# Patient Record
Sex: MALE | Race: WHITE | NOT HISPANIC OR LATINO | URBAN - METROPOLITAN AREA
[De-identification: names, ages, dates, MRNs, and addresses within clinical notes are randomized per-mention and may not be internally consistent; named-entity substitution may affect disease eponyms.]

---

## 2018-08-09 ENCOUNTER — TRANSFERRED RECORDS (OUTPATIENT)
Dept: HEALTH INFORMATION MANAGEMENT | Facility: CLINIC | Age: 58
End: 2018-08-09

## 2018-08-21 ENCOUNTER — TRANSFERRED RECORDS (OUTPATIENT)
Dept: HEALTH INFORMATION MANAGEMENT | Facility: CLINIC | Age: 58
End: 2018-08-21

## 2019-01-23 ENCOUNTER — TRANSFERRED RECORDS (OUTPATIENT)
Dept: HEALTH INFORMATION MANAGEMENT | Facility: CLINIC | Age: 59
End: 2019-01-23

## 2019-02-19 ENCOUNTER — TRANSFERRED RECORDS (OUTPATIENT)
Dept: HEALTH INFORMATION MANAGEMENT | Facility: CLINIC | Age: 59
End: 2019-02-19

## 2019-02-21 ENCOUNTER — TRANSFERRED RECORDS (OUTPATIENT)
Dept: HEALTH INFORMATION MANAGEMENT | Facility: CLINIC | Age: 59
End: 2019-02-21

## 2019-02-26 ENCOUNTER — TRANSFERRED RECORDS (OUTPATIENT)
Dept: HEALTH INFORMATION MANAGEMENT | Facility: CLINIC | Age: 59
End: 2019-02-26

## 2019-02-26 ENCOUNTER — PRE VISIT (OUTPATIENT)
Dept: UROLOGY | Facility: CLINIC | Age: 59
End: 2019-02-26

## 2019-03-02 ENCOUNTER — DOCUMENTATION ONLY (OUTPATIENT)
Dept: CARE COORDINATION | Facility: CLINIC | Age: 59
End: 2019-03-02

## 2019-03-27 ENCOUNTER — PRE VISIT (OUTPATIENT)
Dept: UROLOGY | Facility: CLINIC | Age: 59
End: 2019-03-27

## 2019-03-27 NOTE — TELEPHONE ENCOUNTER
Chief Complaint : New bladder tumors      New Hx/Sx:    Records/Orders/Proced: [facility]     Pt Contacted: no    At Rooming:  Normal

## 2019-06-03 ENCOUNTER — ALLIED HEALTH/NURSE VISIT (OUTPATIENT)
Dept: UROLOGY | Facility: CLINIC | Age: 59
End: 2019-06-03
Payer: COMMERCIAL

## 2019-06-03 ENCOUNTER — PRE VISIT (OUTPATIENT)
Dept: SURGERY | Facility: CLINIC | Age: 59
End: 2019-06-03

## 2019-06-03 ENCOUNTER — ANCILLARY PROCEDURE (OUTPATIENT)
Dept: CT IMAGING | Facility: CLINIC | Age: 59
End: 2019-06-03
Attending: UROLOGY
Payer: COMMERCIAL

## 2019-06-03 ENCOUNTER — OFFICE VISIT (OUTPATIENT)
Dept: UROLOGY | Facility: CLINIC | Age: 59
End: 2019-06-03
Payer: COMMERCIAL

## 2019-06-03 VITALS
HEIGHT: 72 IN | SYSTOLIC BLOOD PRESSURE: 146 MMHG | DIASTOLIC BLOOD PRESSURE: 84 MMHG | HEART RATE: 75 BPM | WEIGHT: 219.2 LBS | BODY MASS INDEX: 29.69 KG/M2

## 2019-06-03 DIAGNOSIS — N32.89 BLADDER MASS: Primary | ICD-10-CM

## 2019-06-03 DIAGNOSIS — N32.89 BLADDER MASS: ICD-10-CM

## 2019-06-03 LAB
CREAT BLD-MCNC: 3.8 MG/DL (ref 0.66–1.25)
GFR SERPL CREATININE-BSD FRML MDRD: 17 ML/MIN/{1.73_M2}

## 2019-06-03 RX ORDER — ALLOPURINOL 100 MG/1
200 TABLET ORAL
COMMUNITY
Start: 2019-02-28

## 2019-06-03 RX ORDER — LANOLIN ALCOHOL/MO/W.PET/CERES
1000 CREAM (GRAM) TOPICAL
COMMUNITY
Start: 2019-02-28

## 2019-06-03 RX ORDER — HYDRALAZINE HYDROCHLORIDE 100 MG/1
100 TABLET, FILM COATED ORAL
COMMUNITY
Start: 2019-04-03

## 2019-06-03 RX ORDER — CARVEDILOL 25 MG/1
25 TABLET ORAL
COMMUNITY
Start: 2019-04-03

## 2019-06-03 RX ORDER — CEFAZOLIN SODIUM 1 G/50ML
1 INJECTION, SOLUTION INTRAVENOUS SEE ADMIN INSTRUCTIONS
Status: CANCELLED | OUTPATIENT
Start: 2019-06-03

## 2019-06-03 RX ORDER — CEFAZOLIN SODIUM 2 G/50ML
2 SOLUTION INTRAVENOUS
Status: CANCELLED | OUTPATIENT
Start: 2019-06-03

## 2019-06-03 RX ORDER — ISOSORBIDE MONONITRATE 30 MG/1
TABLET, EXTENDED RELEASE ORAL
Refills: 3 | COMMUNITY
Start: 2019-05-10

## 2019-06-03 RX ORDER — ACETAMINOPHEN 500 MG
1000 TABLET ORAL
COMMUNITY
Start: 2018-08-09 | End: 2019-06-07

## 2019-06-03 RX ORDER — ASPIRIN 81 MG/1
TABLET, COATED ORAL
Refills: 3 | COMMUNITY
Start: 2019-04-03

## 2019-06-03 ASSESSMENT — ENCOUNTER SYMPTOMS
DYSURIA: 0
DIFFICULTY URINATING: 0
EYE PAIN: 0
HEMATURIA: 1
EYE WATERING: 1
FLANK PAIN: 0
DOUBLE VISION: 0

## 2019-06-03 ASSESSMENT — MIFFLIN-ST. JEOR: SCORE: 1857.28

## 2019-06-03 ASSESSMENT — PAIN SCALES - GENERAL: PAINLEVEL: NO PAIN (0)

## 2019-06-03 NOTE — NURSING NOTE
Chief Complaint   Patient presents with     Consult For     Bladder tumors       Blood pressure 146/84, pulse 75, height 1.829 m (6'), weight 99.4 kg (219 lb 3.2 oz). Body mass index is 29.73 kg/m .    There is no problem list on file for this patient.      No Known Allergies    Current Outpatient Medications   Medication Sig Dispense Refill     acetaminophen (TYLENOL) 500 MG tablet Take 1,000 mg by mouth       allopurinol (ZYLOPRIM) 100 MG tablet Take 200 mg by mouth       ASPIRIN LOW DOSE 81 MG EC tablet   3     carvedilol (COREG) 25 MG tablet Take 25 mg by mouth       cyanocobalamin (VITAMIN B-12) 1000 MCG tablet Take 1,000 mcg by mouth       hydrALAZINE (APRESOLINE) 100 MG tablet Take 100 mg by mouth       isosorbide mononitrate (IMDUR) 30 MG 24 hr tablet   3       Social History     Tobacco Use     Smoking status: Never Smoker     Smokeless tobacco: Never Used   Substance Use Topics     Alcohol use: None     Drug use: None       Dang Rincon LPN  6/3/2019  9:02 AM
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
## 2019-06-03 NOTE — TELEPHONE ENCOUNTER
FUTURE VISIT INFORMATION      SURGERY INFORMATION:    Date: 19    Location: [facility] OR    Surgeon:  Nasim Ventura    Anesthesia Type:  Other    RECORDS REQUESTED FROM:       Primary Care Provider: Luis Alfredo Segura    Pertinent Medical History: Bladder mass, COngestive heart failure, Chronic Kidney disease, Hypertension    Most recent EKG+ Tracin18- Segura- requested tracing    Most recent ECHO: 18
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
## 2019-06-03 NOTE — PATIENT INSTRUCTIONS
Schedule CT for today.  Schedule surgery.        It was a pleasure meeting with you today.  Thank you for allowing me and my team the privilege of caring for you today.  YOU are the reason we are here, and I truly hope we provided you with the excellent service you deserve.  Please let us know if there is anything else we can do for you so that we can be sure you are leaving completely satisfied with your care experience.    It was a pleasure meeting with you today.  Thank you for allowing me and my team the privilege of caring for you today.  YOU are the reason we are here, and I truly hope we provided you with the excellent service you deserve.  Please let us know if there is anything else we can do for you so that we can be sure you are leaving completely satisfied with your care experience.        Dang Rincon LPN

## 2019-06-03 NOTE — NURSING NOTE
Pre Op Teaching Flowsheet       Pre and Post op Patient Education  Relevant Diagnosis:  Bladder tumor  Surgical procedure:  Cystoscopy possible biopsy and fulguration or transurethral resection of bladder tumor, possible cysview instillation  Teaching Topic:  Pre and post op teaching  Person Involved in teaching: Cesar Garrison    Motivation Level:  Asks Questions: Yes  Eager to Learn:  Yes  Cooperative: Yes  Receptive (willing/able to accept information):  Yes    Patient demonstrates understanding of the following:  Date of surgery:  6/17/19  Location of surgery:  [facility]- 5th Floor  History and Physical and any other testing necessary prior to surgery: Yes  Required time line for completion of History and Physical and any pre-op testing: Yes    Patient demonstrates understanding of the following:  Pre-op bowel prep:  None  Pre-op showering/scrub information with PCMX Soap: Yes  Blood thinner medications discussed and when to stop (if applicable):  Yes      Infection Prevention:   Patient demonstrates understanding of the following:  Surgical procedure site care taught: at time of discharge  Signs and symptoms of infection taught:  Yes      Post-op follow-up:  Discussed how to contact the hospital, nurse, and clinic scheduling staff if necessary.    Instructional materials used/given/mailed:  [facility] Booklet, post op teaching sheet, Map, Soap, and arrival/location information.    Surgical instructions packet given to patient in office:  Yes.    Patient has a  and someone to stay with him for the first 24 hours.

## 2019-06-07 ENCOUNTER — ANESTHESIA EVENT (OUTPATIENT)
Dept: SURGERY | Facility: AMBULATORY SURGERY CENTER | Age: 59
End: 2019-06-07

## 2019-06-07 ENCOUNTER — OFFICE VISIT (OUTPATIENT)
Dept: SURGERY | Facility: CLINIC | Age: 59
End: 2019-06-07
Payer: COMMERCIAL

## 2019-06-07 VITALS
OXYGEN SATURATION: 97 % | TEMPERATURE: 97.8 F | DIASTOLIC BLOOD PRESSURE: 70 MMHG | WEIGHT: 215 LBS | SYSTOLIC BLOOD PRESSURE: 119 MMHG | HEIGHT: 72 IN | BODY MASS INDEX: 29.12 KG/M2 | HEART RATE: 76 BPM

## 2019-06-07 DIAGNOSIS — Z01.818 PREOP EXAMINATION: ICD-10-CM

## 2019-06-07 DIAGNOSIS — D49.4 BLADDER TUMOR: ICD-10-CM

## 2019-06-07 DIAGNOSIS — Z01.818 PREOP EXAMINATION: Primary | ICD-10-CM

## 2019-06-07 LAB
ALBUMIN UR-MCNC: 100 MG/DL
APPEARANCE UR: ABNORMAL
BILIRUB UR QL STRIP: NEGATIVE
COLOR UR AUTO: ABNORMAL
ERYTHROCYTE [DISTWIDTH] IN BLOOD BY AUTOMATED COUNT: 14.7 % (ref 10–15)
GLUCOSE UR STRIP-MCNC: NEGATIVE MG/DL
HCT VFR BLD AUTO: 35.5 % (ref 40–53)
HGB BLD-MCNC: 10.9 G/DL (ref 13.3–17.7)
HGB UR QL STRIP: ABNORMAL
KETONES UR STRIP-MCNC: NEGATIVE MG/DL
LEUKOCYTE ESTERASE UR QL STRIP: ABNORMAL
MCH RBC QN AUTO: 26.3 PG (ref 26.5–33)
MCHC RBC AUTO-ENTMCNC: 30.7 G/DL (ref 31.5–36.5)
MCV RBC AUTO: 86 FL (ref 78–100)
NITRATE UR QL: NEGATIVE
PH UR STRIP: 6 PH (ref 5–7)
PLATELET # BLD AUTO: 215 10E9/L (ref 150–450)
RBC # BLD AUTO: 4.15 10E12/L (ref 4.4–5.9)
RBC #/AREA URNS AUTO: >182 /HPF (ref 0–2)
SOURCE: ABNORMAL
SP GR UR STRIP: 1.01 (ref 1–1.03)
TRANS CELLS #/AREA URNS HPF: 2 /HPF
UROBILINOGEN UR STRIP-MCNC: 0 MG/DL (ref 0–2)
WBC # BLD AUTO: 8.5 10E9/L (ref 4–11)
WBC #/AREA URNS AUTO: 26 /HPF (ref 0–5)

## 2019-06-07 RX ORDER — TORSEMIDE 100 MG/1
50 TABLET ORAL 2 TIMES DAILY
COMMUNITY
Start: 2019-05-30

## 2019-06-07 SDOH — HEALTH STABILITY: MENTAL HEALTH: HOW OFTEN DO YOU HAVE A DRINK CONTAINING ALCOHOL?: NEVER

## 2019-06-07 ASSESSMENT — LIFESTYLE VARIABLES: TOBACCO_USE: 0

## 2019-06-07 ASSESSMENT — MIFFLIN-ST. JEOR: SCORE: 1838.23

## 2019-06-07 NOTE — PATIENT INSTRUCTIONS
Preparing for Your Surgery      Name:  Cesar Garrison   MRN:  [id number]   :  1960   Today's Date:  2019     Arriving for surgery:  Surgery date:  2019  Arrival time:  10:15 am  Please come to:     [facility]  [address] [place] [id number]     Parking is available in front of the Clinics and Surgery Center building from 5:30AM to 8:00PM.  -  Proceed to the 5th floor to check into the Ambulatory Surgery Center.              >> There will be patient concierges on the 1st and 5th floor, for assistance or an escort, if you would like.              >> Please call [phone] with any questions.    What can I eat or drink?  -  You may have solid food or milk products until 8 hours prior to your surgery. (3 am)  -  You may have water, apple juice or 7up/Sprite until 2 hours prior to your surgery.(9 am)    Which medicines can I take?        Stop Aspirin, vitamins and supplements one week prior to surgery.      Hold Ibuprofen for 24 hours and/or Naproxen for 48 hours prior to surgery.     -  Please take these medications the day of surgery:     All usual am prescription medications         How do I prepare myself?  -  Take two showers: one the night before surgery; and one the morning of surgery.         Use Scrubcare or Hibiclens to wash from neck down.  You may use your own shampoo and conditioner. No other hair products.   -  Do NOT use lotion, powder, deodorant, or antiperspirant the day of your surgery.  -  Do NOT wear any makeup, fingernail polish or jewelry.  - Do not bring your own medications to the hospital, except for inhalers and eye drops.  -  Bring your ID and insurance card.    Questions or Concerns:    -If you are scheduled at the Ambulatory Surgery Center please call [phone].    -For questions after surgery please call your surgeons office.

## 2019-06-07 NOTE — H&P
Pre-Operative H & P     CC:  Preoperative exam to assess for increased cardiopulmonary risk while undergoing surgery and anesthesia.    Date of Encounter: 6/7/2019  Primary Care Physician:  System, Provider Not In  Associated diagnosis:  Bladder tumor    HPI  Cesar Garrison is a 58 year old male who presents for pre-operative H & P in preparation for a Cystoscopy Possible Biopsy And Fulguration Or Transurethral Resection Of Bladder Tumor, Possible Cysview Instillation on 6/17/2019 by Dr. Ventura at [facility].     Cesar Garrison is a 58 year old male with cardiomyopathy, CHF, hypertension, anemia, gout, CKD 4 and diabetes that has a bladder tumor.  He started having hematuria in January 2019.  He treated it with cranberry juice first and it improved the symptoms somewhat, but he continued to still have intermittent hematuria.  He sought evaluation with urology in [place] and further evaluation resulted in the finding of a bladder tumor concerning for malignancy.  He was subsequently referred here to Dr. Ventura for surgical consultation.  The above listed procedure has been recommended.     History is obtained from the patient and the medical record.     Past Medical History  Past Medical History:   Diagnosis Date     Bladder tumor      Cardiomyopathy (H)      CKD (chronic kidney disease)      Congestive heart failure (CHF) (H)      Diabetes mellitus (H)      Gout      Hypertension        Past Surgical History  History reviewed. No pertinent surgical history.    Hx of Blood transfusions/reactions: none    Hx of abnormal bleeding or anti-platelet use: aspirin      Steroid use in the last year: Unknown type of oral steroid in Aug 2018 for a joint issue.  No long term steroids.    Personal or FH with difficulty with Anesthesia:  No history of surgery    Prior to Admission Medications  Current Outpatient Medications   Medication Sig Dispense Refill     allopurinol (ZYLOPRIM) 100 MG tablet  Take 200 mg by mouth       ASPIRIN LOW DOSE 81 MG EC tablet   3     carvedilol (COREG) 25 MG tablet Take 25 mg by mouth       cyanocobalamin (VITAMIN B-12) 1000 MCG tablet Take 1,000 mcg by mouth       hydrALAZINE (APRESOLINE) 100 MG tablet Take 100 mg by mouth       isosorbide mononitrate (IMDUR) 30 MG 24 hr tablet   3     torsemide (DEMADEX) 100 MG tablet Take 50 mg by mouth 2 times daily         Allergies  No Known Allergies    Social History  Social History     Socioeconomic History     Marital status: Single     Spouse name: Not on file     Number of children: 0     Years of education: Not on file     Highest education level: Not on file   Occupational History     Occupation: disability   Social Needs     Financial resource strain: Not on file     Food insecurity:     Worry: Not on file     Inability: Not on file     Transportation needs:     Medical: Not on file     Non-medical: Not on file   Tobacco Use     Smoking status: Never Smoker     Smokeless tobacco: Never Used   Substance and Sexual Activity     Alcohol use: Never     Frequency: Never     Drug use: Never     Sexual activity: Not on file   Lifestyle     Physical activity:     Days per week: Not on file     Minutes per session: Not on file     Stress: Not on file   Relationships     Social connections:     Talks on phone: Not on file     Gets together: Not on file     Attends Bahai service: Not on file     Active member of club or organization: Not on file     Attends meetings of clubs or organizations: Not on file     Relationship status: Not on file     Intimate partner violence:     Fear of current or ex partner: Not on file     Emotionally abused: Not on file     Physically abused: Not on file     Forced sexual activity: Not on file   Other Topics Concern     Not on file   Social History Narrative     Not on file       Family History  Family History   Problem Relation Age of Onset     Dementia Mother      Cerebrovascular Disease Mother       "Osteoarthritis Mother      Cancer Father         unsure what type     Alcoholism Father      No Known Problems Sister      Hypertension Brother      Osteoarthritis Brother      Gout Brother      Other - See Comments Brother         prediabetes     No Known Problems Half-Brother      No Known Problems Half-Sister                  ROS/MED HX  The complete review of systems is negative other than noted in the HPI or here.   ENT/Pulmonary:     (+)EBONY risk factors hypertension, , . .   (-) tobacco use   Neurologic:  - neg neurologic ROS     Cardiovascular: Comment: cardiomyopathy    (+) hypertension----. : . CHF Last EF: 54 date: 8/2018 . . :. .      (-) FLORES   METS/Exercise Tolerance:  >4 METS   Hematologic:     (+) Anemia, -      Musculoskeletal:   (+)  other musculoskeletal- gout - no flare ups in the last year      GI/Hepatic:  - neg GI/hepatic ROS       Renal/Genitourinary:     (+) chronic renal disease, type: CRI, Pt does not require dialysis, Pt has no history of transplant, Other Renal/ Genitourinary, bladder tumor      Endo:     (+) type II DM Last HgA1c: 4.4 date: 5/28/2019 Not using insulin - not using insulin pump Normal glucose range: unknown not previously admitted for DM/DKA Diabetic complications: cardiac problems, .      Psychiatric:  - neg psychiatric ROS       Infectious Disease:  - neg infectious disease ROS       Malignancy:      - no malignancy   Other:    (+) no H/O Chronic Pain,                       PHYSICAL EXAM:   Mental Status/Neuro: A/A/O   Airway: Facies: Feasible  Mallampati: I  Mouth/Opening: Full  TM distance: > 6 cm  Neck ROM: Full   Respiratory: Auscultation: CTAB     Resp. Rate: Normal     Resp. Effort: Normal      CV: Rhythm: Regular  Heart: Normal Sounds  Pulses: Weak  Edema: RLE; LLE   Comments: Trace BLE edema     Dental:  Dental Comments: Multiple missing and broken teeth                Temp: 97.8  F (36.6  C)   BP: 119/70 Pulse: 76     SpO2: 97 %         215 lbs 0 oz  6' 0\"   Body " mass index is 29.16 kg/m .       Physical Exam  Constitutional: Awake, alert, cooperative, no apparent distress, and appears stated age.  Eyes: Pupils equal, round and reactive to light, extra ocular muscles intact, sclera clear, conjunctiva normal.  HENT: Normocephalic, oral pharynx with moist mucus membranes.  Poor dentition with multiple missing and broken teeth. No goiter appreciated.   Respiratory: Clear to auscultation bilaterally, no crackles or wheezing.  Cardiovascular: Regular rate and rhythm, normal S1 and S2, and no murmur noted.  Carotids +2, no bruits. Trace BLE edema. Palpable pulses to radial.  Weak  DP and PT arteries.   GI: Normal bowel sounds, soft, non-distended, non-tender, no masses palpated, no hepatosplenomegaly.    Lymph/Hematologic: No cervical lymphadenopathy and no supraclavicular lymphadenopathy.  Genitourinary:  deferred  Skin: Warm and dry.  No rashes at anticipated surgical site.   Musculoskeletal: Full ROM of neck. There is no redness, warmth, or swelling of the joints. Gross motor strength is normal.    Neurologic: Awake, alert, oriented to name, place and time. Cranial nerves II-XII are grossly intact. Gait is normal.   Neuropsychiatric: Calm, cooperative. Normal affect.     Labs: (personally reviewed)   6/3/19 10:13 AM [id number]    Component Value Flag Ref Range Units Status Collected Lab   Creatinine 3.8  High   0.66 - 1.25 mg/dL Final 06/03/2019 10:13    GFR Estimate 17  Low   >60 mL/min/ Final 06/03/2019 10:13    GFR Estimate If Black            Component      Latest Ref Rng & Units 6/7/2019   WBC      4.0 - 11.0 10e9/L 8.5   RBC Count      4.4 - 5.9 10e12/L 4.15 (L)   Hemoglobin      13.3 - 17.7 g/dL 10.9 (L)   Hematocrit      40.0 - 53.0 % 35.5 (L)   MCV      78 - 100 fl 86   MCH      26.5 - 33.0 pg 26.3 (L)   MCHC      31.5 - 36.5 g/dL 30.7 (L)   RDW      10.0 - 15.0 % 14.7   Platelet Count      150 - 450 10e9/L 215       Echocardiogram  12/2018  Ejection Fraction  "54     LV Mass Index 131     LV End-Diastolic Diameter 54     LV End-Systolic Diameter 38     LV End-Diastolic Volume 176     LV End-Systolic Volume 81     LV Interventricular Septal Wall Thickness 13     LV Posterior Wall Thickness 13     Relative Wall Thickness 48        Stress test  8/21/2019  1. abnormal nuclear cardiac stress test due to a small partially reversible mid to distal inferior wall defect consistent with infarction and superimposed mild ischemia  2. Incrased left ventricular volumse with preserved LV systolic function.  LVEF 56%  3. The ECG stress portion of the test  was negative.    EKG  8/2018  Normal sinus rhythm  Nonspecific ST abnormality  When compared with ECG of 23-JUL-2018 17:57,  QT has shortened        Outside records reviewed from: Care Everywhere        ASSESSMENT and PLAN  Cesar Garrison is a 58 year old male scheduled for a Cystoscopy Possible Biopsy And Fulguration Or Transurethral Resection Of Bladder Tumor, Possible Cysview Instillation on 6/17/2019 by Dr. Ventura in evaluation of a bladder tumor.  PAC referral for risk assessment and optimization for anesthesia with comorbid conditions of: cardiomyopathy, CHF, hypertension, anemia, gout, CKD 4 and diabetes.    Pre-operative considerations:  1.  Cardiac:  Functional status- METS >4.  He walks >1 mile regularly and rides a stationary bike in his home for exercise.  He follows with cardiology at [facility] in [place], [place] for the above cardiac conditions.  He had acute CHF last summer with cardiomyopathy and was found to have an EF of 23%  He is now managed on coreg, demadex and hydralazine.  He had a follow up echocardiogram in Dec 2018 that showed an EF of 54%.  He had a cardiac perfusion test in Aug 2018 that noted \"a small partially reversible mid to distal inferior wall defect consistent with infarction and superimposed mild ischemia,\" but an angiogram was deferred due to his CKD stage 4.  He is medically manged with coreg, aspirin " and imdur.  He was seen last at his cardiology clinic on 4/3/2019 and no further testing was recommended at that time.  Low risk surgery with >11% risk of major adverse cardiac event.   2.  Pulm:  Airway feasible.  EBONY risk: intermediate.  Never smoker.   3.  GI:  Risk of PONV score = 1.  If > 2, anti-emetic intervention recommended.  4.  Endo:  Diabetes is currently diet and exercise controlled.  His last A1c on 5/282019 was 4.4.  He was previously on glipizide, but it was stopped.    5. Heme:  He has anemia of chronic disease.  His hgb today is stable at 10.9.  6. Renal:  He is followed by nephrology in [place], [place] for CKD stage 4.  He has a left forearm AV fistula that has been placed for possible future dialysis, but he doesn't currently require dialysis.     VTE risk: 0.5%    Patient is optimized and is acceptable candidate for the proposed procedure.  No further diagnostic evaluation is needed.     Patient also discussed with Dr. Diaz.      Blanca aHssan, KULDIP, RN, APRN  Preoperative Assessment Center  [facility]  Phone: [phone]  Fax: [phone]

## 2019-06-07 NOTE — ANESTHESIA PREPROCEDURE EVALUATION
Anesthesia Pre-Procedure Evaluation    Patient: Cesar Garrison   MRN:     [id number] Gender:   male   Age:    58 year old :      1960        Preoperative Diagnosis: Bladder Tumor   Procedure(s):  Cystoscopy Possible Biopsy And Fulguration Or Transurethral Resection Of Bladder Tumor, Possible Cysview Instillation     Past Medical History:   Diagnosis Date     Bladder tumor      CKD (chronic kidney disease)      Congestive heart failure (CHF) (H)      Diabetes mellitus (H)      Gout      Hypertension       No past surgical history on file.       Anesthesia Evaluation     . Pt has not had prior anesthetic            ROS/MED HX    ENT/Pulmonary:     (+)EBONY risk factors hypertension, , . .   (-) tobacco use   Neurologic:  - neg neurologic ROS     Cardiovascular: Comment: cardiomyopathy    (+) hypertension----. : . CHF Last EF: 54 date: 2018 . . :. .      (-) FLORES   METS/Exercise Tolerance:  >4 METS   Hematologic:     (+) Anemia, -      Musculoskeletal:   (+)  other musculoskeletal- gout - no flare ups in the last year      GI/Hepatic:  - neg GI/hepatic ROS       Renal/Genitourinary:     (+) chronic renal disease, type: CRI, Pt does not require dialysis, Pt has no history of transplant, Other Renal/ Genitourinary, bladder tumor      Endo:     (+) type II DM Last HgA1c: 4.4 date: 2019 Not using insulin - not using insulin pump Normal glucose range: unknown not previously admitted for DM/DKA Diabetic complications: cardiac problems, .      Psychiatric:  - neg psychiatric ROS       Infectious Disease:  - neg infectious disease ROS       Malignancy:      - no malignancy   Other:    (+) no H/O Chronic Pain,                       PHYSICAL EXAM:   Mental Status/Neuro: A/A/O   Airway: Facies: Feasible  Mallampati: I  Mouth/Opening: Full  TM distance: > 6 cm  Neck ROM: Full   Respiratory: Auscultation: CTAB     Resp. Rate: Normal     Resp. Effort: Normal      CV: Rhythm: Regular  Heart: Normal Sounds  Pulses:  Weak  Edema: RLE; LLE   Comments: Trace BLE edema     Dental:  Dental Comments: Multiple missing and broken teeth                No results found for: WBC, HGB, HCT, PLT, CRP, SED, NA, POTASSIUM, CHLORIDE, CO2, BUN, CR, GLC, MERARY, PHOS, MAG, ALBUMIN, PROTTOTAL, ALT, AST, GGT, ALKPHOS, BILITOTAL, BILIDIRECT, LIPASE, AMYLASE, ANGE, PTT, INR, FIBR, TSH, T4, T3, HCG, HCGS, CKTOTAL, CKMB, TROPN    Preop Vitals  BP Readings from Last 3 Encounters:   06/07/19 119/70   06/03/19 146/84    Pulse Readings from Last 3 Encounters:   06/07/19 76   06/03/19 75      Resp Readings from Last 3 Encounters:   No data found for Resp    SpO2 Readings from Last 3 Encounters:   06/07/19 97%      Temp Readings from Last 1 Encounters:   06/07/19 97.8  F (36.6  C)    Ht Readings from Last 1 Encounters:   06/07/19 1.829 m (6')      Wt Readings from Last 1 Encounters:   06/07/19 97.5 kg (215 lb)    Estimated body mass index is 29.16 kg/m  as calculated from the following:    Height as of this encounter: 1.829 m (6').    Weight as of this encounter: 97.5 kg (215 lb).     LDA:            Assessment:   ASA SCORE: 3       Documentation: H&P complete; Preop Testing complete; Consents complete   Proceeding: Proceed without further delay  Tobacco Use:  NO Active use of Tobacco/UNKNOWN Tobacco use status     Plan:   Anes. Type:  General   Pre-Induction: Midazolam IV; Acetaminophen PO   Induction:  IV (Standard)   Airway: Oral ETT   Access/Monitoring: PIV   Maintenance: Balanced   Emergence: Procedure Site   Logistics: Same Day Surgery     Postop Pain/Sedation Strategy:  Standard-Options: Opioids PRN     PONV Management:  Adult Risk Factors:, Non-Smoker, Postop Opioids  Prevention: Ondansetron; Propofol Infusion     CONSENT: Direct conversation   Plan and risks discussed with: Patient   Blood Products: Consent Deferred (Minimal Blood Loss)                  PAC Discussion and Assessment    ASA Classification: 3  Case is suitable for: ASC  Anesthetic  "techniques and relevant risks discussed: GA  Invasive monitoring and risk discussed:   Types:   Possibility and Risk of blood transfusion discussed:   NPO instructions given:   Additional anesthetic preparation and risks discussed:   Needs early admission to pre-op area:   Other:     PAC Resident/NP Anesthesia Assessment:  Cesar Garrison is a 58 year old male scheduled for a Cystoscopy Possible Biopsy And Fulguration Or Transurethral Resection Of Bladder Tumor, Possible Cysview Instillation on 6/17/2019 by Dr. Ventura in evaluation of a bladder tumor.  PAC referral for risk assessment and optimization for anesthesia with comorbid conditions of: cardiomyopathy, CHF, hypertension, anemia, gout, CKD 4 and diabetes.    Pre-operative considerations:  1.  Cardiac:  Functional status- METS >4.  He walks >1 mile regularly and rides a stationary bike in his home for exercise.  He follows with cardiology at [facility] in [place], [place] for the above cardiac conditions.  He had acute CHF last summer with cardiomyopathy and was found to have an EF of 23%  He is now managed on coreg, demadex and hydralazine.  He had a follow up echocardiogram in Dec 2018 that showed an EF of 54%.  He had a cardiac perfusion test in Aug 2018 that noted \"a small partially reversible mid to distal inferior wall defect consistent with infarction and superimposed mild ischemia,\" but an angiogram was deferred due to his CKD stage 4.  He is medically manged with coreg, aspirin and imdur.  He was seen last at his cardiology clinic on 4/3/2019 and no further testing was recommended at that time.  Low risk surgery with >11% risk of major adverse cardiac event.   2.  Pulm:  Airway feasible.  EBONY risk: intermediate.  Never smoker.   3.  GI:  Risk of PONV score = 1.  If > 2, anti-emetic intervention recommended.  4.  Endo:  Diabetes is currently diet and exercise controlled.  His last A1c on 5/282019 was 4.4.  He was previously on glipizide, but it was stopped.    5. " Heme:  He has anemia of chronic disease.  His hgb today is stable at 10.9.  6. Renal:  He is followed by nephrology in [place], [place] for CKD stage 4.  He has a left forearm AV fistula that has been placed for possible future dialysis, but he doesn't currently require dialysis.     VTE risk: 0.5%    Patient is optimized and is acceptable candidate for the proposed procedure.  No further diagnostic evaluation is needed.     Patient also discussed with Dr. Diaz.    **For further details of assessment, testing, and physical exam please see H and P completed on same date.          Blanca Hassan DNP, RN, APRN      Reviewed and Signed by PAC Mid-Level Provider/Resident  Mid-Level Provider/Resident: Blanca Hassan DNP, RN, APRN  Date: 6/7/2019  Time: 1229    Attending Anesthesiologist Anesthesia Assessment:        Anesthesiologist:   Date:   Time:   Pass/Fail:   Disposition:     PAC Pharmacist Assessment:        Pharmacist:   Date:   Time:        DAISY Herrera CNP

## 2019-06-07 NOTE — RESULT ENCOUNTER NOTE
CBC okay for surgery.  Urine culture pending - urology to follow.       Blanca Hassan DNP, RN, ANP-C

## 2019-06-08 LAB
BACTERIA SPEC CULT: NO GROWTH
Lab: NORMAL
SPECIMEN SOURCE: NORMAL

## 2019-06-17 ENCOUNTER — ANESTHESIA (OUTPATIENT)
Dept: SURGERY | Facility: AMBULATORY SURGERY CENTER | Age: 59
End: 2019-06-17

## 2019-06-17 ENCOUNTER — HOSPITAL ENCOUNTER (OUTPATIENT)
Facility: AMBULATORY SURGERY CENTER | Age: 59
End: 2019-06-17
Attending: UROLOGY
Payer: COMMERCIAL

## 2019-06-17 VITALS
TEMPERATURE: 97.2 F | DIASTOLIC BLOOD PRESSURE: 67 MMHG | OXYGEN SATURATION: 98 % | RESPIRATION RATE: 14 BRPM | SYSTOLIC BLOOD PRESSURE: 125 MMHG | HEART RATE: 60 BPM | WEIGHT: 218 LBS | BODY MASS INDEX: 29.53 KG/M2 | HEIGHT: 72 IN

## 2019-06-17 DIAGNOSIS — D49.4 BLADDER TUMOR: Primary | ICD-10-CM

## 2019-06-17 LAB
GLUCOSE BLDC GLUCOMTR-MCNC: 106 MG/DL (ref 70–99)
GLUCOSE BLDC GLUCOMTR-MCNC: 122 MG/DL (ref 70–99)

## 2019-06-17 RX ORDER — NALOXONE HYDROCHLORIDE 0.4 MG/ML
.1-.4 INJECTION, SOLUTION INTRAMUSCULAR; INTRAVENOUS; SUBCUTANEOUS
Status: DISCONTINUED | OUTPATIENT
Start: 2019-06-17 | End: 2019-06-18 | Stop reason: HOSPADM

## 2019-06-17 RX ORDER — LIDOCAINE 40 MG/G
CREAM TOPICAL
Status: DISCONTINUED | OUTPATIENT
Start: 2019-06-17 | End: 2019-06-17 | Stop reason: HOSPADM

## 2019-06-17 RX ORDER — ONDANSETRON 4 MG/1
4 TABLET, ORALLY DISINTEGRATING ORAL EVERY 30 MIN PRN
Status: DISCONTINUED | OUTPATIENT
Start: 2019-06-17 | End: 2019-06-18 | Stop reason: HOSPADM

## 2019-06-17 RX ORDER — CEFAZOLIN SODIUM 1 G/50ML
1 SOLUTION INTRAVENOUS SEE ADMIN INSTRUCTIONS
Status: DISCONTINUED | OUTPATIENT
Start: 2019-06-17 | End: 2019-06-17 | Stop reason: HOSPADM

## 2019-06-17 RX ORDER — HYDROMORPHONE HYDROCHLORIDE 1 MG/ML
.3-.5 INJECTION, SOLUTION INTRAMUSCULAR; INTRAVENOUS; SUBCUTANEOUS EVERY 10 MIN PRN
Status: DISCONTINUED | OUTPATIENT
Start: 2019-06-17 | End: 2019-06-18 | Stop reason: HOSPADM

## 2019-06-17 RX ORDER — DEXAMETHASONE SODIUM PHOSPHATE 4 MG/ML
INJECTION, SOLUTION INTRA-ARTICULAR; INTRALESIONAL; INTRAMUSCULAR; INTRAVENOUS; SOFT TISSUE PRN
Status: DISCONTINUED | OUTPATIENT
Start: 2019-06-17 | End: 2019-06-17

## 2019-06-17 RX ORDER — ONDANSETRON 2 MG/ML
4 INJECTION INTRAMUSCULAR; INTRAVENOUS EVERY 30 MIN PRN
Status: DISCONTINUED | OUTPATIENT
Start: 2019-06-17 | End: 2019-06-18 | Stop reason: HOSPADM

## 2019-06-17 RX ORDER — SODIUM CHLORIDE, SODIUM LACTATE, POTASSIUM CHLORIDE, CALCIUM CHLORIDE 600; 310; 30; 20 MG/100ML; MG/100ML; MG/100ML; MG/100ML
INJECTION, SOLUTION INTRAVENOUS CONTINUOUS
Status: DISCONTINUED | OUTPATIENT
Start: 2019-06-17 | End: 2019-06-18 | Stop reason: HOSPADM

## 2019-06-17 RX ORDER — OXYCODONE HYDROCHLORIDE 5 MG/1
5 TABLET ORAL EVERY 4 HOURS PRN
Status: DISCONTINUED | OUTPATIENT
Start: 2019-06-17 | End: 2019-06-18 | Stop reason: HOSPADM

## 2019-06-17 RX ORDER — MEPERIDINE HYDROCHLORIDE 25 MG/ML
12.5 INJECTION INTRAMUSCULAR; INTRAVENOUS; SUBCUTANEOUS
Status: DISCONTINUED | OUTPATIENT
Start: 2019-06-17 | End: 2019-06-18 | Stop reason: HOSPADM

## 2019-06-17 RX ORDER — LIDOCAINE HYDROCHLORIDE 20 MG/ML
INJECTION, SOLUTION INFILTRATION; PERINEURAL PRN
Status: DISCONTINUED | OUTPATIENT
Start: 2019-06-17 | End: 2019-06-17

## 2019-06-17 RX ORDER — FENTANYL CITRATE 50 UG/ML
INJECTION, SOLUTION INTRAMUSCULAR; INTRAVENOUS PRN
Status: DISCONTINUED | OUTPATIENT
Start: 2019-06-17 | End: 2019-06-17

## 2019-06-17 RX ORDER — PROPOFOL 10 MG/ML
INJECTION, EMULSION INTRAVENOUS PRN
Status: DISCONTINUED | OUTPATIENT
Start: 2019-06-17 | End: 2019-06-17

## 2019-06-17 RX ORDER — PROPOFOL 10 MG/ML
INJECTION, EMULSION INTRAVENOUS CONTINUOUS PRN
Status: DISCONTINUED | OUTPATIENT
Start: 2019-06-17 | End: 2019-06-17

## 2019-06-17 RX ORDER — GABAPENTIN 300 MG/1
300 CAPSULE ORAL ONCE
Status: COMPLETED | OUTPATIENT
Start: 2019-06-17 | End: 2019-06-17

## 2019-06-17 RX ORDER — SODIUM CHLORIDE, SODIUM LACTATE, POTASSIUM CHLORIDE, CALCIUM CHLORIDE 600; 310; 30; 20 MG/100ML; MG/100ML; MG/100ML; MG/100ML
INJECTION, SOLUTION INTRAVENOUS CONTINUOUS
Status: DISCONTINUED | OUTPATIENT
Start: 2019-06-17 | End: 2019-06-17 | Stop reason: HOSPADM

## 2019-06-17 RX ORDER — ACETAMINOPHEN 325 MG/1
975 TABLET ORAL ONCE
Status: COMPLETED | OUTPATIENT
Start: 2019-06-17 | End: 2019-06-17

## 2019-06-17 RX ORDER — FENTANYL CITRATE 50 UG/ML
25-50 INJECTION, SOLUTION INTRAMUSCULAR; INTRAVENOUS
Status: DISCONTINUED | OUTPATIENT
Start: 2019-06-17 | End: 2019-06-17 | Stop reason: HOSPADM

## 2019-06-17 RX ORDER — TOLTERODINE TARTRATE 2 MG/1
2 TABLET, EXTENDED RELEASE ORAL 2 TIMES DAILY
Qty: 14 TABLET | Refills: 0 | Status: SHIPPED | OUTPATIENT
Start: 2019-06-17

## 2019-06-17 RX ORDER — OXYCODONE HYDROCHLORIDE 5 MG/1
5-10 TABLET ORAL EVERY 4 HOURS PRN
Qty: 7 TABLET | Refills: 0 | Status: SHIPPED | OUTPATIENT
Start: 2019-06-17

## 2019-06-17 RX ORDER — CEFAZOLIN SODIUM 2 G/50ML
2 SOLUTION INTRAVENOUS
Status: COMPLETED | OUTPATIENT
Start: 2019-06-17 | End: 2019-06-17

## 2019-06-17 RX ADMIN — SODIUM CHLORIDE, SODIUM LACTATE, POTASSIUM CHLORIDE, CALCIUM CHLORIDE: 600; 310; 30; 20 INJECTION, SOLUTION INTRAVENOUS at 10:28

## 2019-06-17 RX ADMIN — PROPOFOL 150 MCG/KG/MIN: 10 INJECTION, EMULSION INTRAVENOUS at 11:45

## 2019-06-17 RX ADMIN — Medication 20 MG: at 12:14

## 2019-06-17 RX ADMIN — FENTANYL CITRATE 50 MCG: 50 INJECTION, SOLUTION INTRAMUSCULAR; INTRAVENOUS at 11:44

## 2019-06-17 RX ADMIN — Medication 30 MG: at 11:44

## 2019-06-17 RX ADMIN — CEFAZOLIN SODIUM 2 G: 1 SOLUTION INTRAVENOUS at 11:44

## 2019-06-17 RX ADMIN — LIDOCAINE HYDROCHLORIDE 100 MG: 20 INJECTION, SOLUTION INFILTRATION; PERINEURAL at 11:45

## 2019-06-17 RX ADMIN — CEFAZOLIN SODIUM 2 G: 2 SOLUTION INTRAVENOUS at 10:28

## 2019-06-17 RX ADMIN — PROPOFOL 100 MG: 10 INJECTION, EMULSION INTRAVENOUS at 12:14

## 2019-06-17 RX ADMIN — DEXAMETHASONE SODIUM PHOSPHATE 4 MG: 4 INJECTION, SOLUTION INTRA-ARTICULAR; INTRALESIONAL; INTRAMUSCULAR; INTRAVENOUS; SOFT TISSUE at 11:44

## 2019-06-17 RX ADMIN — GABAPENTIN 300 MG: 300 CAPSULE ORAL at 10:24

## 2019-06-17 RX ADMIN — PROPOFOL 200 MG: 10 INJECTION, EMULSION INTRAVENOUS at 11:45

## 2019-06-17 RX ADMIN — ACETAMINOPHEN 975 MG: 325 TABLET ORAL at 10:24

## 2019-06-17 RX ADMIN — SODIUM CHLORIDE, SODIUM LACTATE, POTASSIUM CHLORIDE, CALCIUM CHLORIDE: 600; 310; 30; 20 INJECTION, SOLUTION INTRAVENOUS at 11:39

## 2019-06-17 RX ADMIN — Medication 10 MG: at 12:38

## 2019-06-17 ASSESSMENT — MIFFLIN-ST. JEOR: SCORE: 1851.84

## 2019-06-17 NOTE — ANESTHESIA CARE TRANSFER NOTE
Patient: Cesar Garrison    Procedure(s):  Cystoscopy ,Transurethral Resection Of Bladder Tumor, resection of multiple bladder tumors    Diagnosis: Bladder Tumor  Diagnosis Additional Information: No value filed.    Anesthesia Type:   No value filed.     Note:  Airway :Face Mask  Patient transferred to:PACU  Handoff Report: Identifed the Patient, Identified the Reponsible Provider, Reviewed the pertinent medical history, Discussed the surgical course, Reviewed Intra-OP anesthesia mangement and issues during anesthesia, Set expectations for post-procedure period and Allowed opportunity for questions and acknowledgement of understanding      Vitals: (Last set prior to Anesthesia Care Transfer)    CRNA VITALS  6/17/2019 1316 - 6/17/2019 1351      6/17/2019             Pulse:  64    SpO2:  97 %    Resp Rate (observed):  10                Electronically Signed By: DAISY Vila CRNA  June 17, 2019  1:51 PM

## 2019-06-17 NOTE — DISCHARGE INSTRUCTIONS
[facility]  Home Care Following Anesthesia  For 24 hours after surgery:  1. Get plenty of rest.  A responsible adult must stay with you for at least 24 hours after you leave the surgery center.  2. Do not drive or use heavy equipment.  If you have weakness or tingling, don't drive or use heavy equipment until this feeling goes away.   3. Do not drink alcohol.   4. Avoid strenuous or risky activities.  Ask for help when climbing stairs.  5. You may feel lightheaded.  IF so, sit for a few minutes before standing.  Have someone help you get up.   6. If you have nausea (feel sick to your stomach): Drink only clear liquids such as apple juice, ginger ale, broth or 7-Up.  Rest may also help.  Be sure to drink enough fluids.  Move to a regular diet as you feel able.   7. You may have a slight fever.  Call the doctor if your fever is over 100 F (37.7 C) (taken under the tongue) or lasts longer than 24 hours.  8. You may have a dry mouth, a sore throat, muscle aches or trouble sleeping. These should go away after 24 hours.  9. Do not make important or legal decisions.               Tips for taking pain medications  To get the best pain relief possible, remember these points:    Take pain medications as directed, before pain becomes severe.    Pain medication can upset your stomach: taking it with food may help.    Constipation is a common side effect of pain medication. Drink plenty of  fluids.    Eat foods high in fiber. Take a stool softener if recommended by your doctor or pharmacist.    Do not drink alcohol, drive or operate machinery while taking pain medications.    Ask about other ways to control pain, such as with heat, ice or relaxation.    Tylenol/Acetaminophen Consumption  To help encourage the safe use of acetaminophen, the makers of TYLENOL  have lowered the maximum daily dose for single-ingredient Extra Strength TYLENOL  (acetaminophen) products sold in the [place] from 8  pills per day (4,000 mg) to 6 pills per day (3,000 mg). The dosing interval has also changed from 2 pills every 4-6 hours to 2 pills every 6 hours.    If you feel your pain relief is insufficient, you may take Tylenol/Acetaminophen in addition to your narcotic pain medication.     Be careful not to exceed 3,000 mg of Tylenol/Acetaminophen in a 24 hour period from all sources.    If you are taking extra strength Tylenol/acetaminophen (500 mg), the maximum dose is 6 tablets in 24 hours.    If you are taking regular strength acetaminophen (325 mg), the maximum dose is 9 tablets in 24 hours.    Call a doctor for any of the followin. Signs of infection (fever, growing tenderness at the surgery site, a large amount of drainage or bleeding, severe pain, foul-smelling drainage, redness, swelling).  2. It has been over 8 to 10 hours since surgery and you are still not able to urinate (pass water).  3. Headache for over 24 hours.  4. Numbness, tingling or weakness the day after surgery (if you had spinal anesthesia).  Your doctor is:       Dr. Nasim Ventura, Prostate and Urology: [phone]               Or dial [phone] and ask for the resident on call for:  Prostate Urology  For emergency care, call the:  [facility]:  [phone] (TTY for hearing impaired: [phone])  Care of Indwelling Floyd Catheter  Cleanliness is VERY important!  1. Wash well around catheter with soap and water and rinse well.  Do this every morning and before bedtime.  2. Empty leg bag or bed bag into toilet whenever it becomes half full.  3. When disconnecting and reconnecting, wipe both the catheter end and tubing tip with alcohol. (You may use commercially prepared alcohol wipes or regular cotton balls soaked in alcohol.)  4. Rinse leg bag and bed bag inside and out after each use. You can soak leg bag and/or bed bag in two to three ounces of white vinegar when not in use. Rinse thoroughly before reconnecting  to catheter.  5. You may clamp the catheter for short periods of time (two to three hours) if you are not uncomfortable. If planning intercourse: clamp catheter, disconnect from bag and   a) Tape catheter along shaft of penis, if male; or  b) Tape catheter to abdomen, if female  6. Drink lots of fluids (at least eight to ten cups/glasses per day) and take two to four grams of Vitamin C (optional) per day.      7. Watch for sign of catheter-associated urinary tract infection which include:      Cloudy urine, sediment in urine (may look like sand particles or white                           flakes), foul smelling urine    A burning feeling, pressure or pain in your lower abdomen    A burning feeling in the urethra or genitalia    Aching in the back (by the kidney)    At the time of discharge from the hospital, you have a Floyd catheter with a 10 cc balloon. It was inserted on June 17, 2019 and should be changed one month from that date on 7/17/19.

## 2019-06-17 NOTE — ANESTHESIA POSTPROCEDURE EVALUATION
Anesthesia POST Procedure Evaluation    Patient: Cesar Garrison   MRN:     [id number] Gender:   male   Age:    58 year old :      1960        Preoperative Diagnosis: Bladder Tumor   Procedure(s):  Cystoscopy ,Transurethral Resection Of Bladder Tumor, resection of multiple bladder tumors   Postop Comments: No value filed.       Anesthesia Type:  General  No value filed.    Reportable Event: NO     PAIN: Uncomplicated   Sign Out status: Comfortable, Well controlled pain     PONV: No PONV   Sign Out status:  No Nausea or Vomiting     Neuro/Psych: Uneventful perioperative course   Sign Out Status: Preoperative baseline; Age appropriate mentation     Airway/Resp.: Uneventful perioperative course   Sign Out Status: Non labored breathing, age appropriate RR; Resp. Status within EXPECTED Parameters     CV: Uneventful perioperative course   Sign Out status: Appropriate BP and perfusion indices; Appropriate HR/Rhythm     Disposition:   Sign Out in:  PACU  Disposition:  Phase II; Home  Recovery Course: Uneventful  Follow-Up: Not required           Last Anesthesia Record Vitals:  CRNA VITALS  2019 1316 - 2019 1416      2019             Pulse:  64    SpO2:  97 %    Resp Rate (observed):  10          Last PACU Vitals:  Vitals Value Taken Time   /68 2019  2:16 PM   Temp 36.2  C (97.1  F) 2019  2:16 PM   Pulse 64 2019  2:16 PM   Resp 14 2019  2:16 PM   SpO2 98 % 2019  2:16 PM   Temp src     NIBP     Pulse     SpO2     Resp     Temp     Ht Rate     Temp 2           Electronically Signed By: Alma Condon MD, 2019, 2:37 PM

## 2019-06-17 NOTE — BRIEF OP NOTE
[facility]    Brief Operative Note    Pre-operative diagnosis: Bladder Tumor  Post-operative diagnosis * No post-op diagnosis entered *  Procedure: Procedure(s):  Cystoscopy ,Transurethral Resection Of Bladder Tumor, resection of multiple bladder tumors  Surgeon: Surgeon(s) and Role:     * Nasim Ventura MD - Primary  Anesthesia: General   Estimated blood loss: Minimal  Drains:  Floyd catheter  Specimens:   ID Type Source Tests Collected by Time Destination   A : bladder tumors Tissue Bladder SURGICAL PATHOLOGY EXAM Nasim Ventura MD 6/17/2019 12:51 PM      Findings:   See op note.  Complications: None.  Implants:  * No implants in log *     - Floyd to remain for about one week  - Follow up for path review, trial of void

## 2019-06-17 NOTE — LETTER
"    August 6, 2019       TO: Catie Rahman  [address]  [facility] [address]       DearMr.Meli,    We are writing to inform you of your test results.    Please make an appointment with the doctor to review or follow up on your test results.  Appointments can be made by calling [phone].    Resulted Orders   Surgical pathology exam   Result Value Ref Range    Copath Report       Patient Name: CATIE RAHMAN  MR#: [id number]  Specimen #: [id number]  Collected: 6/17/2019  Received: 6/17/2019  Reported: 6/18/2019 16:35  Ordering Phy(s): HAFSA RUBALCAVA    For improved result formatting, select 'View Enhanced Report Format' under   Linked Documents section.    SPECIMEN(S):  Bladder tumors    FINAL DIAGNOSIS:  Bladder, transurethral resection of tumor:  - Invasive high grade urothelial carcinoma  - Carcinoma invades deep lamina propria extensively  - Muscularis propria is present and free of tumor  - Lymphovascular invasion is not identified    I have personally reviewed all specimens and/or slides, including the   listed special stains, and used them  with my medical judgement to determine or confirm the final diagnosis.    Electronically signed out by:    Fermin Gómez M.D., [facility]    GROSS:  The specimen is received in formalin with proper patient identification,   labeled \"bladder tumors\".  The  specimen consists of a 3.4 g, 3.5 x 3.1 x 0.5 cm  aggregate of red-brown   irregular tissue, which is entirely  submitted in cassettes A1A4. (Dictated by: Beena Conway 6/17/2019 05:00   PM)    MICROSCOPIC:  Microscopic examination was performed.    The technical component of this testing was completed at the [facility], with the professional component performed   at the [facility], [address] [place] [phone] ([phone])    CPT Codes:  A: " 19148-EP6    COLLECTION SITE:  Client: [facility]  Location: [facility] (B)           Thank you for choosing [facility] for your care. Please follow up as previously planned.  Please call with any questions or concerns.    Thank you,  Raissa Weinberg, RN Care Coordinator for  Dr. Nasim Ventura

## 2019-06-18 LAB — COPATH REPORT: NORMAL

## 2019-06-18 NOTE — PROVIDER NOTIFICATION
Brother, Danny, contacted for a post-op call.  Patient admitted to a [facility], brother not sure which hospital, last night d/t pain and lack of urine.  Danny claims that urology at the  was contacted regarding these complications.

## 2019-06-21 ENCOUNTER — TELEPHONE (OUTPATIENT)
Dept: UROLOGY | Facility: CLINIC | Age: 59
End: 2019-06-21

## 2019-06-21 NOTE — TELEPHONE ENCOUNTER
[facility]    Phone Message    May a detailed message be left on voicemail: yes    Reason for Call: Other: Jonnathan calling to request a call back. He has some questions on pts upcoming post op. Please call him back to discuss.      Action Taken: Message routed to:  [facility] uro

## 2019-06-21 NOTE — TELEPHONE ENCOUNTER
Contacted Jonnathan back to discuss questions. Patient's brother and patient want to be followed up at [facility] in [place]. They were given the number to medical records. Also informed him that [facility] can see records through care everywhere.  Leena Vaughn LPN

## 2019-06-26 ENCOUNTER — PRE VISIT (OUTPATIENT)
Dept: UROLOGY | Facility: CLINIC | Age: 59
End: 2019-06-26

## 2019-06-26 NOTE — TELEPHONE ENCOUNTER
Chief Complaint : post op bladder tumor     Records/Orders: pathology review     Pt Contacted: no    At Rooming: print path

## (undated) DEVICE — BAG URINARY DRAIN LUBRISIL IC 4000ML LF 253509A

## (undated) DEVICE — SOL NACL 0.9% IRRIG 3000ML BAG 2B7477

## (undated) DEVICE — ESU ELEC CUTTING LOOP 24/26FR .35MM BIPOLAR 27040GP1-S

## (undated) DEVICE — EVACUATOR BLADDER UROVAC LATEX M0067301250

## (undated) DEVICE — SPECIMEN CONTAINER 5OZ STERILE 2600SA

## (undated) DEVICE — CATH FOLEY 18FR 5ML SILICONE LUBRI-SIL 175818

## (undated) DEVICE — LINEN TOWEL PACK X5 5464

## (undated) DEVICE — PACK CYSTO CUSTOM ASC

## (undated) DEVICE — GLOVE PROTEXIS W/NEU-THERA 7.5  2D73TE75

## (undated) DEVICE — PAD CHUX UNDERPAD 30X36" P3036C

## (undated) RX ORDER — ACETAMINOPHEN 325 MG/1
TABLET ORAL
Status: DISPENSED
Start: 2019-06-17

## (undated) RX ORDER — GABAPENTIN 300 MG/1
CAPSULE ORAL
Status: DISPENSED
Start: 2019-06-17

## (undated) RX ORDER — CEFAZOLIN SODIUM 2 G/50ML
SOLUTION INTRAVENOUS
Status: DISPENSED
Start: 2019-06-17

## (undated) RX ORDER — LIDOCAINE HYDROCHLORIDE 20 MG/ML
INJECTION, SOLUTION EPIDURAL; INFILTRATION; INTRACAUDAL; PERINEURAL
Status: DISPENSED
Start: 2019-06-17

## (undated) RX ORDER — DEXAMETHASONE SODIUM PHOSPHATE 4 MG/ML
INJECTION, SOLUTION INTRA-ARTICULAR; INTRALESIONAL; INTRAMUSCULAR; INTRAVENOUS; SOFT TISSUE
Status: DISPENSED
Start: 2019-06-17

## (undated) RX ORDER — FENTANYL CITRATE 50 UG/ML
INJECTION, SOLUTION INTRAMUSCULAR; INTRAVENOUS
Status: DISPENSED
Start: 2019-06-17

## (undated) RX ORDER — PROPOFOL 10 MG/ML
INJECTION, EMULSION INTRAVENOUS
Status: DISPENSED
Start: 2019-06-17

## (undated) RX ORDER — ONDANSETRON 2 MG/ML
INJECTION INTRAMUSCULAR; INTRAVENOUS
Status: DISPENSED
Start: 2019-06-17